# Patient Record
Sex: FEMALE | Race: WHITE | URBAN - METROPOLITAN AREA
[De-identification: names, ages, dates, MRNs, and addresses within clinical notes are randomized per-mention and may not be internally consistent; named-entity substitution may affect disease eponyms.]

---

## 2017-01-01 ENCOUNTER — NURSING HOME VISIT (OUTPATIENT)
Dept: GERIATRICS | Facility: CLINIC | Age: 82
End: 2017-01-01

## 2017-01-01 ENCOUNTER — HOSPITAL LABORATORY (OUTPATIENT)
Facility: OTHER | Age: 82
End: 2017-01-01

## 2017-01-01 ENCOUNTER — NURSING HOME VISIT (OUTPATIENT)
Dept: GERIATRICS | Facility: CLINIC | Age: 82
End: 2017-01-01
Payer: COMMERCIAL

## 2017-01-01 ENCOUNTER — TELEPHONE (OUTPATIENT)
Dept: CARDIOLOGY | Facility: CLINIC | Age: 82
End: 2017-01-01

## 2017-01-01 ENCOUNTER — TELEPHONE (OUTPATIENT)
Dept: GERIATRICS | Facility: CLINIC | Age: 82
End: 2017-01-01

## 2017-01-01 ENCOUNTER — DOCUMENTATION ONLY (OUTPATIENT)
Dept: GERIATRICS | Facility: CLINIC | Age: 82
End: 2017-01-01

## 2017-01-01 ENCOUNTER — OFFICE VISIT (OUTPATIENT)
Dept: CARDIOLOGY | Facility: CLINIC | Age: 82
End: 2017-01-01
Payer: COMMERCIAL

## 2017-01-01 ENCOUNTER — DOCUMENTATION ONLY (OUTPATIENT)
Dept: OTHER | Facility: CLINIC | Age: 82
End: 2017-01-01

## 2017-01-01 VITALS
OXYGEN SATURATION: 96 % | DIASTOLIC BLOOD PRESSURE: 63 MMHG | WEIGHT: 121 LBS | BODY MASS INDEX: 27.13 KG/M2 | HEART RATE: 65 BPM | SYSTOLIC BLOOD PRESSURE: 98 MMHG

## 2017-01-01 VITALS
RESPIRATION RATE: 17 BRPM | TEMPERATURE: 98 F | SYSTOLIC BLOOD PRESSURE: 138 MMHG | OXYGEN SATURATION: 93 % | WEIGHT: 127 LBS | BODY MASS INDEX: 28.47 KG/M2 | HEART RATE: 85 BPM | DIASTOLIC BLOOD PRESSURE: 61 MMHG

## 2017-01-01 VITALS
WEIGHT: 122 LBS | OXYGEN SATURATION: 93 % | SYSTOLIC BLOOD PRESSURE: 119 MMHG | RESPIRATION RATE: 24 BRPM | HEART RATE: 118 BPM | TEMPERATURE: 101.3 F | DIASTOLIC BLOOD PRESSURE: 66 MMHG | BODY MASS INDEX: 27.35 KG/M2

## 2017-01-01 VITALS
OXYGEN SATURATION: 98 % | BODY MASS INDEX: 27.69 KG/M2 | WEIGHT: 123.5 LBS | HEART RATE: 82 BPM | TEMPERATURE: 98.3 F | RESPIRATION RATE: 18 BRPM | SYSTOLIC BLOOD PRESSURE: 130 MMHG | DIASTOLIC BLOOD PRESSURE: 84 MMHG

## 2017-01-01 VITALS
HEART RATE: 58 BPM | DIASTOLIC BLOOD PRESSURE: 66 MMHG | RESPIRATION RATE: 19 BRPM | TEMPERATURE: 97.6 F | SYSTOLIC BLOOD PRESSURE: 114 MMHG | OXYGEN SATURATION: 95 %

## 2017-01-01 VITALS
OXYGEN SATURATION: 95 % | WEIGHT: 131.9 LBS | DIASTOLIC BLOOD PRESSURE: 57 MMHG | BODY MASS INDEX: 29.57 KG/M2 | RESPIRATION RATE: 12 BRPM | SYSTOLIC BLOOD PRESSURE: 107 MMHG | TEMPERATURE: 98.1 F | HEART RATE: 70 BPM

## 2017-01-01 VITALS
RESPIRATION RATE: 17 BRPM | WEIGHT: 131.9 LBS | HEART RATE: 63 BPM | DIASTOLIC BLOOD PRESSURE: 68 MMHG | OXYGEN SATURATION: 95 % | SYSTOLIC BLOOD PRESSURE: 135 MMHG | BODY MASS INDEX: 29.57 KG/M2 | TEMPERATURE: 97.9 F

## 2017-01-01 DIAGNOSIS — I10 ESSENTIAL HYPERTENSION: ICD-10-CM

## 2017-01-01 DIAGNOSIS — K59.03 DRUG-INDUCED CONSTIPATION: ICD-10-CM

## 2017-01-01 DIAGNOSIS — G89.4 CHRONIC PAIN SYNDROME: Primary | ICD-10-CM

## 2017-01-01 DIAGNOSIS — F33.1 MAJOR DEPRESSIVE DISORDER, RECURRENT EPISODE, MODERATE (H): ICD-10-CM

## 2017-01-01 DIAGNOSIS — E55.9 VITAMIN D DEFICIENCY: ICD-10-CM

## 2017-01-01 DIAGNOSIS — I73.9 PVD (PERIPHERAL VASCULAR DISEASE) (H): Primary | ICD-10-CM

## 2017-01-01 DIAGNOSIS — L89.92 PRESSURE ULCER, STAGE II (H): Primary | ICD-10-CM

## 2017-01-01 DIAGNOSIS — Z51.5 HOSPICE CARE PATIENT: ICD-10-CM

## 2017-01-01 DIAGNOSIS — I25.10 CORONARY ARTERY DISEASE INVOLVING NATIVE CORONARY ARTERY OF NATIVE HEART WITHOUT ANGINA PECTORIS: ICD-10-CM

## 2017-01-01 DIAGNOSIS — Z71.89 ADVANCED DIRECTIVES, COUNSELING/DISCUSSION: Primary | Chronic | ICD-10-CM

## 2017-01-01 DIAGNOSIS — M48.00 SPINAL STENOSIS, UNSPECIFIED SPINAL REGION: Primary | ICD-10-CM

## 2017-01-01 DIAGNOSIS — I73.9 PVD (PERIPHERAL VASCULAR DISEASE) (H): ICD-10-CM

## 2017-01-01 DIAGNOSIS — J18.9 PNEUMONIA OF BOTH LOWER LOBES DUE TO INFECTIOUS ORGANISM: ICD-10-CM

## 2017-01-01 DIAGNOSIS — J44.9 CHRONIC OBSTRUCTIVE PULMONARY DISEASE, UNSPECIFIED COPD TYPE (H): ICD-10-CM

## 2017-01-01 DIAGNOSIS — J18.9 PNEUMONIA OF BOTH LOWER LOBES DUE TO INFECTIOUS ORGANISM: Primary | ICD-10-CM

## 2017-01-01 DIAGNOSIS — R54 FRAIL ELDERLY: ICD-10-CM

## 2017-01-01 LAB
ALBUMIN UR-MCNC: 30 MG/DL
ANION GAP SERPL CALCULATED.3IONS-SCNC: 8 MMOL/L (ref 3–14)
APPEARANCE UR: ABNORMAL
BACTERIA #/AREA URNS HPF: ABNORMAL /HPF
BACTERIA SPEC CULT: NORMAL
BILIRUB UR QL STRIP: NEGATIVE
BUN SERPL-MCNC: 16 MG/DL (ref 7–30)
CALCIUM SERPL-MCNC: 8.3 MG/DL (ref 8.5–10.1)
CHLORIDE SERPL-SCNC: 102 MMOL/L (ref 94–109)
CO2 SERPL-SCNC: 27 MMOL/L (ref 20–32)
COLOR UR AUTO: YELLOW
CREAT SERPL-MCNC: 0.77 MG/DL (ref 0.52–1.04)
ERYTHROCYTE [DISTWIDTH] IN BLOOD BY AUTOMATED COUNT: 14.7 % (ref 10–15)
GFR SERPL CREATININE-BSD FRML MDRD: 70 ML/MIN/1.7M2
GLUCOSE SERPL-MCNC: 83 MG/DL (ref 70–99)
GLUCOSE UR STRIP-MCNC: NEGATIVE MG/DL
HCT VFR BLD AUTO: 41.2 % (ref 35–47)
HGB BLD-MCNC: 13 G/DL (ref 11.7–15.7)
HGB UR QL STRIP: ABNORMAL
KETONES UR STRIP-MCNC: NEGATIVE MG/DL
LEUKOCYTE ESTERASE UR QL STRIP: ABNORMAL
MCH RBC QN AUTO: 28 PG (ref 26.5–33)
MCHC RBC AUTO-ENTMCNC: 31.6 G/DL (ref 31.5–36.5)
MCV RBC AUTO: 89 FL (ref 78–100)
MICRO REPORT STATUS: NORMAL
NITRATE UR QL: NEGATIVE
NON-SQ EPI CELLS #/AREA URNS LPF: ABNORMAL /LPF
PH UR STRIP: 5.5 PH (ref 5–7)
PLATELET # BLD AUTO: 172 10E9/L (ref 150–450)
POTASSIUM SERPL-SCNC: 3.9 MMOL/L (ref 3.4–5.3)
RBC # BLD AUTO: 4.64 10E12/L (ref 3.8–5.2)
RBC #/AREA URNS AUTO: ABNORMAL /HPF (ref 0–2)
SODIUM SERPL-SCNC: 137 MMOL/L (ref 133–144)
SP GR UR STRIP: 1.01 (ref 1–1.03)
SPECIMEN SOURCE: NORMAL
TSH SERPL DL<=0.05 MIU/L-ACNC: 2.17 MU/L (ref 0.4–4)
URN SPEC COLLECT METH UR: ABNORMAL
UROBILINOGEN UR STRIP-ACNC: 0.2 EU/DL (ref 0.2–1)
WBC # BLD AUTO: 8.8 10E9/L (ref 4–11)
WBC #/AREA URNS AUTO: >100 /HPF (ref 0–2)

## 2017-01-01 PROCEDURE — 99309 SBSQ NF CARE MODERATE MDM 30: CPT | Performed by: NURSE PRACTITIONER

## 2017-01-01 PROCEDURE — 99207 ZZC CDG-CORRECTLY CODED, REVIEWED AND AGREE: CPT | Performed by: FAMILY MEDICINE

## 2017-01-01 PROCEDURE — 99207 ZZC CDG-CORRECTLY CODED, REVIEWED AND AGREE: CPT | Performed by: NURSE PRACTITIONER

## 2017-01-01 PROCEDURE — 99310 SBSQ NF CARE HIGH MDM 45: CPT | Performed by: FAMILY MEDICINE

## 2017-01-01 PROCEDURE — 99309 SBSQ NF CARE MODERATE MDM 30: CPT | Mod: GW | Performed by: NURSE PRACTITIONER

## 2017-01-01 PROCEDURE — 99497 ADVNCD CARE PLAN 30 MIN: CPT | Mod: GW | Performed by: NURSE PRACTITIONER

## 2017-01-01 PROCEDURE — 99308 SBSQ NF CARE LOW MDM 20: CPT | Performed by: NURSE PRACTITIONER

## 2017-01-01 PROCEDURE — 99310 SBSQ NF CARE HIGH MDM 45: CPT | Mod: GW | Performed by: NURSE PRACTITIONER

## 2017-01-01 PROCEDURE — 99307 SBSQ NF CARE SF MDM 10: CPT | Performed by: NURSE PRACTITIONER

## 2017-01-01 PROCEDURE — 99203 OFFICE O/P NEW LOW 30 MIN: CPT | Performed by: INTERNAL MEDICINE

## 2017-01-01 RX ORDER — ATROPINE SULFATE 10 MG/ML
2 SOLUTION/ DROPS OPHTHALMIC
COMMUNITY
Start: 2017-01-01

## 2017-01-01 RX ORDER — POLYETHYLENE GLYCOL 3350 17 G/17G
17 POWDER, FOR SOLUTION ORAL DAILY
COMMUNITY
Start: 2017-01-01

## 2017-01-01 RX ORDER — MORPHINE SULFATE 100 MG/5ML
7.5 SOLUTION ORAL
COMMUNITY
Start: 2017-01-01

## 2017-01-01 RX ORDER — POTASSIUM CHLORIDE 1500 MG/1
0.5 TABLET, EXTENDED RELEASE ORAL DAILY
COMMUNITY
Start: 2014-08-28 | End: 2017-01-01

## 2017-01-01 RX ORDER — BISACODYL 10 MG
10 SUPPOSITORY, RECTAL RECTAL 2 TIMES DAILY PRN
COMMUNITY
End: 2017-01-01

## 2017-01-01 RX ORDER — LORAZEPAM 0.5 MG/1
0.5 TABLET ORAL
COMMUNITY
Start: 2017-01-01

## 2017-01-01 RX ORDER — BUDESONIDE AND FORMOTEROL FUMARATE DIHYDRATE 160; 4.5 UG/1; UG/1
2 AEROSOL RESPIRATORY (INHALATION) DAILY
COMMUNITY
Start: 2014-11-23

## 2017-01-11 NOTE — PROGRESS NOTES
Chief Complaint   Patient presents with     Wound Check       HPI:    Nasima López is a 89 year old  (9/11/1926), who is being seen today for an episodic care visit at [facility]. Nsg asking me to see pt today regarding coccyx pressure ulcer. Has been present for about 10 + days. Located on superior end of gluteal cleft. Pt has pressure relieving w/c cushion.    Current treatmet is using barrier cream    nsg concerned about about wound bed now has slough in it    REVIEW OF SYSTEMS:  4 point ROS including Respiratory, CV, GI and , other than that noted in the HPI,  is negative    GENERAL APPEARANCE:  Alert, in no distress  Wound bed 0.1 x 0.2 - scar tissue area surrounding - wound bed is white  No drainage    Assessment/Plan:  Pressure ulcer, stage II  Discussed further treatment options with sgt   enocurage more off loading - with standing q 2hrs while awake.  That - over any topical will help the best.   Pt is not ambulatoy.   Up date us of worsen        MARILEE House CNP

## 2017-02-16 NOTE — PROGRESS NOTES
[facility]    Chief Complaint   Patient presents with     Nursing Home Acute       HPI:    Nasima López is a 89 year old  (9/11/1926), who is being seen today for an episodic care visit at [facility] . Today's concern is:  Chronic pain  She has multiple areas of OA in the joints that cause her significant pain.  When the pain gets worse she will lose her voice infact. She has been on the same dose of MS Contin for quit a while now and may be in need of an increase.  She is in agreement to this.  She had her hearing aid in place yesterday and apparently a piece of the HA was missing when removed from the ear.  Her daughter was upset and felt the ear needed to be examined and then flushed.  There is no foreign body found in the canal.  There is minimal cerumen and no need for flushing.       ALLERGIES: Peanuts [nuts]; Augmentin; Codeine; and Inhaler [amphetamines]  Past Medical, Surgical, Family and Social History reviewed and updated in EPIC.    Current Outpatient Prescriptions   Medication Sig Dispense Refill     sennosides (SENOKOT) 8.6 MG tablet Take 2 tablets by mouth daily       Nutritional Supplements (NUTRITIONAL SUPPLEMENT PO) Take 4 oz by mouth daily       VITAMIN D, CHOLECALCIFEROL, PO Take 2,000 Units by mouth daily       Pantoprazole Sodium (PROTONIX PO) Take 40 mg by mouth At Bedtime        carbamide peroxide (DEBROX) 6.5 % otic (EAR) solution Place 5 drops into both ears X 5days at HS then lavage.  Repeat every 3 months.       calcium carbonate (TUMS) 500 MG chewable tablet Take 2 chew tab by mouth daily       Morphine Sulfate (MS CONTIN PO) Take 30 mg by mouth every 8 hours       HYDROcodone-acetaminophen (NORCO) 7.5-325 MG per tablet Take 1 tablet by mouth every 4 hours as needed for moderate to severe pain       Nitroglycerin (NITROSTAT SL) Place 0.4 mg under the tongue every 5 minutes as needed for chest pain       artificial saliva (BIOTENE MT) AERS spray Take 1  spray by mouth 2 times daily        olopatadine HCl (PATADAY) 0.2 % SOLN Place 1 drop into both eyes daily       ketorolac tromethamine (ACULAR-LS) 0.4 % SOLN ophthalmic solution Place 1 drop into both eyes 2 times daily        LOSARTAN POTASSIUM PO Take 25 mg by mouth daily       Gabapentin (NEURONTIN PO) Take 400 mg by mouth 3 times daily       furosemide (LASIX) 40 MG tablet Take 0.5 tablets by mouth daily. 15 tablet 3     ASPIRIN NOT PRESCRIBED, INTENTIONAL, Antiplatelet medication not prescribed intentionally due to GI Issues / Ulcer Disease 0 each 0     potassium chloride (KLOR-CON) 10 MEQ CR tablet Take 1 tablet by mouth. Every other day (Patient taking differently: daily ) 15 tablet 3     budesonide-formoterol (SYMBICORT) 160-4.5 MCG/ACT inhaler Inhale 2 puffs into the lungs 2 times daily. (Patient taking differently: Inhale 2 puffs into the lungs daily ) 1 Inhaler 5     ipratropium - albuterol 0.5 mg/2.5 mg/3 mL (DUONEB) 0.5-2.5 (3) MG/3ML nebulization Take 3 mLs by nebulization every 6 hours as needed for shortness of breath / dyspnea. 1 Box 1     Medications reviewed:  Medications reconciled to facility chart and changes were made to reflect current medications as identified as above med list. Below are the changes that were made:   Medications stopped since last EPIC medication reconciliation:   There are no discontinued medications.    Medications started since last [facility] medication reconciliation:  No orders of the defined types were placed in this encounter.    REVIEW OF SYSTEMS:  4 point ROS including Respiratory, CV, GI and , other than that noted in the HPI,  is negative    Physical Exam:  /68  Pulse 63  Temp 97.9  F (36.6  C)  Resp 17  Wt 131 lb 14.4 oz (59.8 kg)  SpO2 95%  BMI 29.57 kg/m2  GENERAL APPEARANCE:  Alert, in no distress, cooperative  RESP:  lungs clear to auscultation , no respiratory distress  CV:  peripheral edema 2+ in bilaterally, rate-normal  ABDOMEN:  normal bowel sounds,  soft, nontender, no hepatosplenomegaly or other masses, bowel sounds normal, soft, non-tender  M/S:   Gait and station abnormal she is not able to ambulate and uses a wheelchair for all mobility needs.  She has multiple areas of OA which cause her pain most of the day.  Digits and nails abnormal has deformities over her hands due to OA.  She is having more difficulty using her hands to eat, care forself.  PSYCH:  oriented X 3, memory impaired , she is speaking more about end of life concerns of late.  When asked is interested in Hospice she denies the need.    Recent Labs:    Hospital Laboratory on 11/23/2016   Component Date Value Ref Range Status     Sodium 11/23/2016 143  133 - 144 mmol/L Final     Potassium 11/23/2016 4.7  3.4 - 5.3 mmol/L Final     Chloride 11/23/2016 106  94 - 109 mmol/L Final     Carbon Dioxide 11/23/2016 32  20 - 32 mmol/L Final     Anion Gap 11/23/2016 5  3 - 14 mmol/L Final     Glucose 11/23/2016 77  70 - 99 mg/dL Final     Urea Nitrogen 11/23/2016 14  7 - 30 mg/dL Final     Creatinine 11/23/2016 0.72  0.52 - 1.04 mg/dL Final     GFR Estimate 11/23/2016 76  >60 mL/min/1.7m2 Final    Non  GFR Calc     GFR Estimate If Black 11/23/2016   >60 mL/min/1.7m2 Final                    Value:>90   GFR Calc       Calcium 11/23/2016 8.3* 8.5 - 10.1 mg/dL Final     WBC 11/23/2016 6.2  4.0 - 11.0 10e9/L Final     RBC Count 11/23/2016 4.42  3.8 - 5.2 10e12/L Final     Hemoglobin 11/23/2016 12.8  11.7 - 15.7 g/dL Final     Hematocrit 11/23/2016 41.1  35.0 - 47.0 % Final     MCV 11/23/2016 93  78 - 100 fl Final     MCH 11/23/2016 29.0  26.5 - 33.0 pg Final     MCHC 11/23/2016 31.1* 31.5 - 36.5 g/dL Final     RDW 11/23/2016 13.8  10.0 - 15.0 % Final     Platelet Count 11/23/2016 192  150 - 450 10e9/L Final     Vitamin D Deficiency screening 11/23/2016 24  20 - 75 ug/L Final    Comment: Season, race, dietary intake, and treatment affect the concentration of    25-hydroxy-Vitamin D. Values may decrease during winter months and increase   during summer months. Values 20-29 ug/L may indicate Vitamin D insufficiency   and values <20 ug/L may indicate Vitamin D deficiency.   Vitamin D determination is routinely performed by an immunoassay specific for   25 hydroxyvitamin D3.  If an individual is on vitamin D2 (ergocalciferol)   supplementation, please specify 25 OH vitamin D2 and D3 level determination   by   LCMSMS test [id number].            Assessment/Plan:  Chronic pain    Orders:  1.  Increase AM MS Contin dose  to 45mg daily    Electronically signed by  Nissa Avitia, CNP, APRN

## 2017-02-19 NOTE — PROGRESS NOTES
Received a call from nursing at [facility] that Nasima had a med error discovered on her from yesterday.  Was to have 45mg at 4am and 30mg other two times.  Recent medication increase to this dose.  Received 45mg at 4am and then again at 12N on 2/18/17    Resident doing fine.  No new orders    Electronically signed by Dawna Kruse RN, CNP

## 2017-02-22 NOTE — PROGRESS NOTES
[place] [facility]    Chief Complaint   Patient presents with     [facility] Regulatory       HPI:    Nasima López is a 89 year old  (9/11/1926), who is being seen today for a federally mandated E/M visit at [facility] . Today's concerns are:  1. Chronic pain syndrome    2. Essential hypertension    3. Chronic obstructive pulmonary disease, unspecified COPD type (H)    4. Major depressive disorder, recurrent episode, moderate (H)      She is having more pain.  Her voice is softer, she is more irritable.  She is becoming more willing to remain in her room.  She does sleep more also.  Blood pressure remains in good range without changes.  She is not having issues with breathing.  She does not need the oxygen at all times but does use it intermittently.    She is presenting with some evidence of depression.  She is not currently on an antidepressant.    ALLERGIES: Peanuts [nuts]; Augmentin; Codeine; and Inhaler [amphetamines]  PAST MEDICAL HISTORY:  has a past medical history of Advanced directives, counseling/discussion (10/1/2012); Aftercare following surgery of the teeth, oral cavity and digestive system, NEC (9/2/2014); Asthma; Bradycardia; CAD (coronary artery disease); Carotid stenosis, bilateral (7/26/2012); Carpel tunnel syndrome; Chronic airway obstruction, not elsewhere classified (9/2/2014); Chronic pain (9/2/2014); Chronic venous hypertension due to DVT; COPD (chronic obstructive pulmonary disease) (H); Disorder of bone and cartilage, unspecified; Diverticulitis of colon (4/28/2011); DJD (degenerative joint disease); DJD (degenerative joint disease) of cervical spine (10/1/2012); Fibromyalgia (9/16/2014); GERD (gastroesophageal reflux disease); Glaucoma; Hand swelling (12/3/2014); HTN (hypertension) (1/5/2010); Hyperlipidemia LDL goal <130 (10/31/2010); Lung nodule (9/30/2011); 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  PAST SURGICAL HISTORY:  has a past surgical history that includes surgical history of - (1956); surgical history of - (1962); surgical history of - (1962); surgical history of - (1974); surgical history of - (1975); surgical history of - (1990); surgical history of - (1990); surgical history of - (1992); surgical history of - (2000); surgical history of - (2002); surgical history of - (2004); Colonoscopy (6/21/2011); and hysterectomy, cervix status unknown (1967).  FAMILY HISTORY: family history includes CANCER in her brother, sister, and sister; HEART DISEASE in her brother and mother; Hypertension in her brother, brother, brother, father, maternal grandfather, maternal grandmother, mother, paternal grandfather, paternal grandmother, sister, sister, and sister; Respiratory in her mother.  SOCIAL HISTORY:  reports that she has never smoked. She has never used smokeless tobacco. She reports that she does not drink alcohol or use illicit drugs.    MEDICATIONS:  Current Outpatient Prescriptions   Medication Sig Dispense Refill     sennosides (SENOKOT) 8.6 MG tablet Take 2 tablets by mouth daily       Nutritional Supplements (NUTRITIONAL SUPPLEMENT PO) Take 4 oz by mouth daily       VITAMIN D, CHOLECALCIFEROL, PO Take 2,000 Units by mouth daily       Pantoprazole Sodium (PROTONIX PO) Take 40 mg by mouth At Bedtime        carbamide peroxide (DEBROX) 6.5 % otic (EAR) solution Place 5 drops into both ears X 5days at HS then lavage.  Repeat every 3 months.       calcium carbonate (TUMS) 500 MG chewable tablet Take 2 chew tab by mouth daily       Morphine Sulfate (MS CONTIN PO) Take 30 mg by mouth  every 8 hours       HYDROcodone-acetaminophen (NORCO) 7.5-325 MG per tablet Take 1 tablet by mouth every 4 hours as needed for moderate to severe pain       Nitroglycerin (NITROSTAT SL) Place 0.4 mg under the tongue every 5 minutes as needed for chest pain       artificial saliva (BIOTENE MT) AERS spray Take 1 spray by mouth 2 times daily        olopatadine HCl (PATADAY) 0.2 % SOLN Place 1 drop into both eyes daily       ketorolac tromethamine (ACULAR-LS) 0.4 % SOLN ophthalmic solution Place 1 drop into both eyes 2 times daily        LOSARTAN POTASSIUM PO Take 25 mg by mouth daily       Gabapentin (NEURONTIN PO) Take 400 mg by mouth 3 times daily       furosemide (LASIX) 40 MG tablet Take 0.5 tablets by mouth daily. 15 tablet 3     ASPIRIN NOT PRESCRIBED, INTENTIONAL, Antiplatelet medication not prescribed intentionally due to GI Issues / Ulcer Disease 0 each 0     potassium chloride (KLOR-CON) 10 MEQ CR tablet Take 1 tablet by mouth. Every other day (Patient taking differently: daily ) 15 tablet 3     budesonide-formoterol (SYMBICORT) 160-4.5 MCG/ACT inhaler Inhale 2 puffs into the lungs 2 times daily. (Patient taking differently: Inhale 2 puffs into the lungs daily ) 1 Inhaler 5     ipratropium - albuterol 0.5 mg/2.5 mg/3 mL (DUONEB) 0.5-2.5 (3) MG/3ML nebulization Take 3 mLs by nebulization every 6 hours as needed for shortness of breath / dyspnea. 1 Box 1     Medications reviewed:  Medications reconciled to facility chart and changes were made to reflect current medications as identified as above med list. Below are the changes that were made:   Medications stopped since last EPIC medication reconciliation:   There are no discontinued medications.    Medications started since last [facility] medication reconciliation:  No orders of the defined types were placed in this encounter.    Case Management:  I have reviewed the care plan and MDS and do agree with the plan. Patient's desire to return to the community is  present, but is not able due to care needs .  Information reviewed:  Medications, vital signs, orders, and nursing notes.    ROS:  4 point ROS including Respiratory, CV, GI and , other than that noted in the HPI,  is negative    Exam:  /57  Pulse 70  Temp 98.1  F (36.7  C)  Resp 12  Wt 131 lb 14.4 oz (59.8 kg)  SpO2 95%  BMI 29.57 kg/m2  GENERAL APPEARANCE:  Alert, anxious  EYES:  EOM normal, Conjunctiva and lids normal  NECK:  no adenopathy, no thyromegaly  RESP:  lungs clear to auscultation , no respiratory distress  CV:  peripheral edema 2+ in bilaterally, rate-normal  ABDOMEN:  no guarding or rebound, bowel sounds normal  M/S:   Gait and station abnormal She is not able to ambulate at all. She is able to stand and pivot with walker and assistance.  She uses a wheelchair for all mobility issues.  Digits and nails normal  SKIN:  Her skin is very fragil.  She has enlarged joints in her fingers, ankles.  NEURO:   Cranial nerves 2-12 are normal tested and grossly at patient's baseline, Examination of sensation by touch normal  PSYCH:  memory impaired , affect and mood normal    Lab/Diagnostic data:    Hospital Laboratory on 11/23/2016   Component Date Value Ref Range Status     Sodium 11/23/2016 143  133 - 144 mmol/L Final     Potassium 11/23/2016 4.7  3.4 - 5.3 mmol/L Final     Chloride 11/23/2016 106  94 - 109 mmol/L Final     Carbon Dioxide 11/23/2016 32  20 - 32 mmol/L Final     Anion Gap 11/23/2016 5  3 - 14 mmol/L Final     Glucose 11/23/2016 77  70 - 99 mg/dL Final     Urea Nitrogen 11/23/2016 14  7 - 30 mg/dL Final     Creatinine 11/23/2016 0.72  0.52 - 1.04 mg/dL Final     GFR Estimate 11/23/2016 76  >60 mL/min/1.7m2 Final    Non  GFR Calc     GFR Estimate If Black 11/23/2016   >60 mL/min/1.7m2 Final                    Value:>90   GFR Calc       Calcium 11/23/2016 8.3* 8.5 - 10.1 mg/dL Final     WBC 11/23/2016 6.2  4.0 - 11.0 10e9/L Final     RBC Count 11/23/2016 4.42   3.8 - 5.2 10e12/L Final     Hemoglobin 11/23/2016 12.8  11.7 - 15.7 g/dL Final     Hematocrit 11/23/2016 41.1  35.0 - 47.0 % Final     MCV 11/23/2016 93  78 - 100 fl Final     MCH 11/23/2016 29.0  26.5 - 33.0 pg Final     MCHC 11/23/2016 31.1* 31.5 - 36.5 g/dL Final     RDW 11/23/2016 13.8  10.0 - 15.0 % Final     Platelet Count 11/23/2016 192  150 - 450 10e9/L Final     Vitamin D Deficiency screening 11/23/2016 24  20 - 75 ug/L Final    Comment: Season, race, dietary intake, and treatment affect the concentration of   25-hydroxy-Vitamin D. Values may decrease during winter months and increase   during summer months. Values 20-29 ug/L may indicate Vitamin D insufficiency   and values <20 ug/L may indicate Vitamin D deficiency.   Vitamin D determination is routinely performed by an immunoassay specific for   25 hydroxyvitamin D3.  If an individual is on vitamin D2 (ergocalciferol)   supplementation, please specify 25 OH vitamin D2 and D3 level determination   by   LCMSMS test [id number].           ASSESSMENT/PLAN  1. Chronic pain syndrome    2. Essential hypertension    3. Chronic obstructive pulmonary disease, unspecified COPD type (H)    4. Major depressive disorder, recurrent episode, moderate (H)      Orders:  1.  Will continue with recent increase in MS Contin now at 45 mg po BID and D/C the 30 mg dose at midday.  2.  Continue to monitor B/P readings  3.  No additional changes are needed in medications or in POC.    Electronically signed by:  Nissa Avitia, CNP, APRN

## 2017-03-15 NOTE — TELEPHONE ENCOUNTER
"Left message on personal voicemail at [facility] for Annette Blackman, RN supervisor at [facility] 3/8/17 at 0918 to clarify what pt is being seen for. (No order) Note 12/27/16 from Avitia states, \"Pt has PVD needs CTA. Seen by Dr. Patel on 12/20/16. He called on 12/25/16 with orders to the Nursing facility. Order for U/S of both LE for BEN's and evaluate for DVT. Results of the test: Evidence of Arthrosclerotic vascular disease L>R. No occulsions. CTA or conventional program recommended for further evaluation.\" (I do not see this result) Dx: PVD/HTN/COPD. At last OV, Noa Olsen MD ordered overnight oximetry but this order was cancelled 1/5/17. (Pt was to follow with cardiology on a PRN basis) Pt's daughter Vanessa states, \"Dr. Patel wanted her seen for foot swelling bilat-(varies) and cold hands\" Nusrat Zhang RN Cardiology at [facility] March 8, 2017, 9:23 AM   Did not hear back from facility so called again today. Spoke with ELLY Solorzano. She will fax BEN and LE US reports done by [facility] to us for review. She states reason for visit=PAD. Reviewed result of LE US (BEN's unable to be performed 2/2 pain) Call placed to Vanessa, daughter to offer vascular consult with MD Taras. Nusrat Zhang RN Cardiology at [facility] March 15, 2017, 3:03 PM  ADDENDUM: Discussed the above with daughter, Vanessa. Would like to change appt to vascular cardiologist, MD Taras on 4/7/17 at 10:30 am. Appt changed. Nursing Home contacted as well (ELLY Solorzano) Nusrat Zhang RN Cardiology at [facility] March 16, 2017, 10:50 AM  "

## 2017-04-07 NOTE — MR AVS SNAPSHOT
"              After Visit Summary   4/7/2017    Nasima López    MRN: [id number]           Patient Information     Date Of Birth          9/11/1926        Visit Information        Provider Department      4/7/2017 10:30 AM Lashay Grajeda MD [facility] PHYSICIAN HEART AT [facility]        Today's Diagnoses     Spinal stenosis    -  1      Care Instructions    Thank you for your  Heart Care visit today. Your provider has recommended the following:  Medication Changes:  None today. Continue taking your medications as you have been.  Recommendations:  1. A referral has been placed for you to receive physical therapy for your spinal stenosis. I have attached a copy of this order.  Follow-up:  See cardiology on an \"as needed\" basis.  We kindly ask that you call cardiology scheduling at [phone] three months prior to requested revisit date to schedule future cardiology appointments.  Reminder:  1. Please bring in your current medication list or your medication, over the counter supplements and vitamin bottles as we will review these at each office visit.               [facility]~[address]~[place], [place]~[address]  Questions about your visit today?  Call your Cardiology Clinic RN's-Nora Zhang and/or Rose Pérez at [phone].              Follow-ups after your visit        Additional Services     PHYSICAL THERAPY REFERRAL       *This therapy referral will be filtered to a centralized scheduling office at [facility] and the patient will receive a call to schedule an appointment at a [place] location most convenient for them. *     [facility] provides Physical Therapy evaluation and treatment and many specialty services across the [place] system.  If requesting a specialty program, please choose from the list below.    If you have not heard from the scheduling office within 2 " "business days, please call [phone] for all locations, with the exception of [place], please call [phone].  Treatment: Evaluation & Treatment  Special Instructions/Modalities: Neuropathic Pain/Spinal Stenosis. Please advise exercises and therapy plan.  Special Programs: None    Please be aware that coverage of these services is subject to the terms and limitations of your health insurance plan.  Call member services at your health plan with any benefit or coverage questions.      **Note to Provider:  If you are referring outside of [place] for the therapy appointment, please list the name of the location in the  special instructions  above, print the referral and give to the patient to schedule the appointment.                  Future tests that were ordered for you today     Open Future Orders        Priority Expected Expires Ordered    PHYSICAL THERAPY REFERRAL Routine 4/10/2017 4/7/2019 4/7/2017            Who to contact     If you have questions or need follow up information about today's clinic visit or your schedule please contact [facility] PHYSICIAN HEART AT [facility] directly at [phone].  Normal or non-critical lab and imaging results will be communicated to you by [employer], letter or phone within 4 business days after the clinic has received the results. If you do not hear from us within 7 days, please contact the clinic through [employer] or phone. If you have a critical or abnormal lab result, we will notify you by phone as soon as possible.  Submit refill requests through [employer] or call your pharmacy and they will forward the refill request to us. Please allow 3 business days for your refill to be completed.          Additional Information About Your Visit        [employer] Information     [employer] lets you send messages to your doctor, view your test results, renew your prescriptions, schedule appointments and more. To sign up, go to [email or web address] . Click on \"Log in\" " "on the left side of the screen, which will take you to the Welcome page. Then click on \"Sign up Now\" on the right side of the page.     You will be asked to enter the access code listed below, as well as some personal information. Please follow the directions to create your username and password.     Your access code is: [email or web address]  Expires: 2017 11:04 AM     Your access code will  in 90 days. If you need help or a new code, please call your [place] clinic or [phone].        Care EveryWhere ID     This is your Care EveryWhere ID. This could be used by other organizations to access your [place] medical records  [id number]        Your Vitals Were     Pulse Pulse Oximetry BMI (Body Mass Index)             65 96% 27.13 kg/m2          Blood Pressure from Last 3 Encounters:   17 98/63   17 107/57   17 135/68    Weight from Last 3 Encounters:   17 54.9 kg (121 lb)   17 59.8 kg (131 lb 14.4 oz)   17 59.8 kg (131 lb 14.4 oz)                 Today's Medication Changes          These changes are accurate as of: 17 11:18 AM.  If you have any questions, ask your nurse or doctor.               These medicines have changed or have updated prescriptions.        Dose/Directions    budesonide-formoterol 160-4.5 MCG/ACT Inhaler   Commonly known as:  SYMBICORT   This may have changed:  when to take this   Used for:  COPD (chronic obstructive pulmonary disease) (H)        Dose:  2 puff   Inhale 2 puffs into the lungs 2 times daily.   Quantity:  1 Inhaler   Refills:  5       potassium chloride 10 MEQ CR tablet   Commonly known as:  KLOR-CON   This may have changed:    - when to take this  - additional instructions   Used for:  HTN (hypertension)        Take 1 tablet by mouth. Every other day   Quantity:  15 tablet   Refills:  3                Primary Care Provider Office Phone # Fax #    MARILEE Juan [phone]       [facility] [address] [address]        Thank you!     Thank you for choosing [facility] PHYSICIAN HEART AT [facility]  for your care. Our goal is always to provide you with excellent care. Hearing back from our patients is one way we can continue to improve our services. Please take a few minutes to complete the written survey that you may receive in the mail after your visit with us. Thank you!             Your Updated Medication List - Protect others around you: Learn how to safely use, store and throw away your medicines at www.disposemymeds.org.          This list is accurate as of: 4/7/17 11:18 AM.  Always use your most recent med list.                   Brand Name Dispense Instructions for use    artificial saliva Aers spray      Take 1 spray by mouth 2 times daily       ASPIRIN NOT PRESCRIBED    INTENTIONAL    0 each    Antiplatelet medication not prescribed intentionally due to GI Issues / Ulcer Disease       budesonide-formoterol 160-4.5 MCG/ACT Inhaler    SYMBICORT    1 Inhaler    Inhale 2 puffs into the lungs 2 times daily.       calcium carbonate 500 MG chewable tablet    TUMS     Take 2 chew tab by mouth daily       carbamide peroxide 6.5 % otic solution    DEBROX     Place 5 drops into both ears X 5days at HS then lavage.  Repeat every 3 months.       furosemide 40 MG tablet    LASIX    15 tablet    Take 0.5 tablets by mouth daily.       HYDROcodone-acetaminophen 7.5-325 MG per tablet    NORCO     Take 1 tablet by mouth every 4 hours as needed for moderate to severe pain       ipratropium - albuterol 0.5 mg/2.5 mg/3 mL 0.5-2.5 (3) MG/3ML neb solution    DUONEB    1 Box    Take 3 mLs by nebulization every 6 hours as needed for shortness of breath / dyspnea.       ketorolac tromethamine 0.4 % Soln ophthalmic solution    ACULAR-LS     Place 1 drop into both eyes 2 times daily       LOSARTAN POTASSIUM PO      Take 25 mg by mouth daily       MS CONTIN PO      Take 30 mg by mouth every 8  hours       NEURONTIN PO      Take 400 mg by mouth 3 times daily       NITROSTAT SL      Place 0.4 mg under the tongue every 5 minutes as needed for chest pain       NUTRITIONAL SUPPLEMENT PO      Take 4 oz by mouth daily       PATADAY 0.2 % Soln   Generic drug:  olopatadine HCl      Place 1 drop into both eyes daily       potassium chloride 10 MEQ CR tablet    KLOR-CON    15 tablet    Take 1 tablet by mouth. Every other day       PROTONIX PO      Take 40 mg by mouth At Bedtime       sennosides 8.6 MG tablet    SENOKOT     Take 2 tablets by mouth daily       VITAMIN D (CHOLECALCIFEROL) PO      Take 2,000 Units by mouth daily

## 2017-04-07 NOTE — PROGRESS NOTES
HISTORY OF PRESENT ILLNESS:  I had the pleasure to follow up with your patient, Ms. Nasima Lópze, in the Cardiovascular Medicine Clinic.  Previously she has been seeing my colleague, Dr. Olsen, who last saw her in 03/2015.        The patient, per her previous workup, has had a diagnosis of spinal stenosis for many years.  Unfortunately has had neuropathic pain for a long period of time.  In addition to this, she has also had upper extremity numbness and tingling and states that she has significant neck pain and rotator cuff pain as well.  She is mainly wheelchair bound and does have some physical therapy, which is provided to her at her nursing facility.        She states that she had a DVT in the past and this was evaluated.  She was on anticoagulation for a period of time, although is not on any recently.        The patient is accompanied by her daughter who provides additional history.  The patient states that she has considerable pain and is sometimes reluctant to take her pain medications.  She had a duplex ultrasound performed; however, ABIs were not able to be performed due to chronic neuropathic pain.  It appears on the right side lower extremity, there appears to be some monophasic signal at the distal posterior tibial and dorsalis pedis.  However, no acute findings were noted on the right lower extremity.  On the left side, there is by biphasic to monophasic signal in the left SFA and no acute findings in the popliteal and left calf arteries have a monophasic signal.  This suggests that although the disease is present, no high-grade stenoses would be suggested based on the Doppler waveform.  The patient, as noted above, does have significant neuropathic pain and I believe this is the etiology for her symptoms.      LIMITED PHYSICAL EXAMINATION:     VITAL SIGNS:  Blood pressure 90/63, heart rate is 65, weight 121 pounds.   GENERAL:  The patient is alert, oriented, appears stated age.   HEENT:  Oropharynx  is clear.   NECK:  Supple range of motion is impaired due to chronic arthritis.   CARDIOVASCULAR:  Distant heart tones.  Normal S1, S2.   LUNGS:  Coarse, diminished breath sounds.   ABDOMEN:  Soft, obese, nontender.   EXTREMITIES:  Changes suggestive of a dermatosclerosis.  No open wounds or sores are noted.  Pulses are 0 to 1+ in the DP and posterior tibial bilaterally.  Trace edema is appreciated.  Warm to touch.      ASSESSMENT:   1.  Spinal stenosis.   2.  Likely neuropathic pain.   3.  History of DVT in the past with varicose veins.   4.  Coronary artery disease with remote history of a stent.   5.  Hyperlipidemia.   6.  Hypertension.      RECOMMENDATIONS:  We have gone over patient's recent duplex ultrasound which was performed in 2016.  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  She did not have ABIs performed due to pain in her legs; however, the duplex signal suggests arterial flow should be adequate.  She does not have any open wounds or sores and her symptoms appear to be most compatible with neurogenic claudication or neuropathic symptoms from her spinal stenosis.  I have advised her that conservative management is usually appropriate and I will try to arrange for physical therapy, mainly focused on back exercises and mobility, to help relieve her pain.  I have also asked her to use her p.r.n. pain medications as-needed and not to be shy about requesting them.  She is somewhat reluctant to ask for them.      Thank you kindly for allowing us to participate in her care.  We will ask that she come back on a p.r.n. basis.      cc:      MARILEE Lima, CNP   [facility]   [address] [place] [address], [address]   [place] [address]         DESTINY ROSS MD             D: 2017 11:12   T: 2017 17:40   MT: LOW      Name:     PATRICIA BRIGHT   MRN:      [id number]        Account:      [id number]   :      1926            Service Date: 04/07/2017      Document: [id number]

## 2017-04-07 NOTE — LETTER
4/7/2017    Diana Gibson, MARILEE CNP  [facility]   [address]  [facility] [address]    RE: Nasima López       Dear Colleague,    I had the pleasure to follow up with your patient, Ms. Nasima López, in the Cardiovascular Medicine Clinic.  Previously she has been seeing my colleague, Dr. Olsen, who last saw her in 03/2015.        The patient, per her previous workup, has had a diagnosis of spinal stenosis for many years.  Unfortunately has had neuropathic pain for a long period of time.  In addition to this, she has also had upper extremity numbness and tingling and states that she has significant neck pain and rotator cuff pain as well.  She is mainly wheelchair bound and does have some physical therapy, which is provided to her at her nursing facility.        She states that she had a DVT in the past and this was evaluated.  She was on anticoagulation for a period of time, although is not on any recently.        The patient is accompanied by her daughter who provides additional history.  The patient states that she has considerable pain and is sometimes reluctant to take her pain medications.  She had a duplex ultrasound performed; however, ABIs were not able to be performed due to chronic neuropathic pain.  It appears on the right side lower extremity, there appears to be some monophasic signal at the distal posterior tibial and dorsalis pedis.  However, no acute findings were noted on the right lower extremity.  On the left side, there is by biphasic to monophasic signal in the left SFA and no acute findings in the popliteal and left calf arteries have a monophasic signal.  This suggests that although the disease is present, no high-grade stenoses would be suggested based on the Doppler waveform.  The patient, as noted above, does have significant neuropathic pain and I believe this is the etiology for her symptoms.      LIMITED PHYSICAL EXAMINATION:     VITAL SIGNS:  Blood pressure  90/63, heart rate is 65, weight 121 pounds.   GENERAL:  The patient is alert, oriented, appears stated age.   HEENT:  Oropharynx is clear.   NECK:  Supple range of motion is impaired due to chronic arthritis.   CARDIOVASCULAR:  Distant heart tones.  Normal S1, S2.   LUNGS:  Coarse, diminished breath sounds.   ABDOMEN:  Soft, obese, nontender.   EXTREMITIES:  Changes suggestive of a dermatosclerosis.  No open wounds or sores are noted.  Pulses are 0 to 1+ in the DP and posterior tibial bilaterally.  Trace edema is appreciated.  Warm to touch.   Outpatient Encounter Prescriptions as of 4/7/2017   Medication Sig Dispense Refill     sennosides (SENOKOT) 8.6 MG tablet Take 2 tablets by mouth daily       Nutritional Supplements (NUTRITIONAL SUPPLEMENT PO) Take 4 oz by mouth daily       Pantoprazole Sodium (PROTONIX PO) Take 40 mg by mouth At Bedtime        [DISCONTINUED] VITAMIN D, CHOLECALCIFEROL, PO Take 2,000 Units by mouth daily       [DISCONTINUED] carbamide peroxide (DEBROX) 6.5 % otic (EAR) solution Place 5 drops into both ears X 5days at HS then lavage.  Repeat every 3 months.       [DISCONTINUED] calcium carbonate (TUMS) 500 MG chewable tablet Take 2 chew tab by mouth daily       Morphine Sulfate (MS CONTIN PO) Take 45 mg by mouth 2 times daily        [DISCONTINUED] HYDROcodone-acetaminophen (NORCO) 7.5-325 MG per tablet Take 1 tablet by mouth every 4 hours as needed for moderate to severe pain       Nitroglycerin (NITROSTAT SL) Place 0.4 mg under the tongue every 5 minutes as needed for chest pain       [DISCONTINUED] artificial saliva (BIOTENE MT) AERS spray Take 1 spray by mouth 2 times daily        olopatadine HCl (PATADAY) 0.2 % SOLN Place 1 drop into both eyes daily       ketorolac tromethamine (ACULAR-LS) 0.4 % SOLN ophthalmic solution Place 1 drop into both eyes 2 times daily        Gabapentin (NEURONTIN PO) Take 500 mg by mouth 3 times daily        [DISCONTINUED] LOSARTAN POTASSIUM PO Take 12.5 mg by  mouth daily        [DISCONTINUED] furosemide (LASIX) 40 MG tablet Take 0.5 tablets by mouth daily. 15 tablet 3     ASPIRIN NOT PRESCRIBED, INTENTIONAL, Antiplatelet medication not prescribed intentionally due to GI Issues / Ulcer Disease 0 each 0     [DISCONTINUED] potassium chloride (KLOR-CON) 10 MEQ CR tablet Take 1 tablet by mouth. Every other day (Patient taking differently: daily ) 15 tablet 3     [DISCONTINUED] budesonide-formoterol (SYMBICORT) 160-4.5 MCG/ACT inhaler Inhale 2 puffs into the lungs 2 times daily. (Patient taking differently: Inhale 2 puffs into the lungs daily ) 1 Inhaler 5     ipratropium - albuterol 0.5 mg/2.5 mg/3 mL (DUONEB) 0.5-2.5 (3) MG/3ML nebulization Take 3 mLs by nebulization every 6 hours as needed for shortness of breath / dyspnea. 1 Box 1     No facility-administered encounter medications on file as of 4/7/2017.       ASSESSMENT:   1.  Spinal stenosis.   2.  Likely neuropathic pain.   3.  History of DVT in the past with varicose veins.   4.  Coronary artery disease with remote history of a stent.   5.  Hyperlipidemia.   6.  Hypertension.      RECOMMENDATIONS:  We have gone over patient's recent duplex ultrasound which was performed in 12/2016.  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  She did not have ABIs performed due to pain in her legs; however, the duplex signal suggests arterial flow should be adequate.  She does not have any open wounds or sores and her symptoms appear to be most compatible with neurogenic claudication or neuropathic symptoms from her spinal stenosis.  I have advised her that conservative management is usually appropriate and I will try to arrange for physical therapy, mainly focused on back exercises and mobility, to help relieve her pain.  I have also asked her to use her p.r.n. pain medications as-needed and not to be shy about requesting them.  She is somewhat reluctant to ask for  them.      Thank you kindly for allowing us to participate in her care.  We will ask that she come back on a p.r.n. basis.     Sincerely,    Lashay Grajeda MD     [facility]    cc:      MARILEE Lima, CNP   [facility]   [address] [place] [address], [address]   [place] [address]

## 2017-04-18 NOTE — PROGRESS NOTES
[facility]    Chief Complaint   Patient presents with     senior care Regulatory       HPI:   Obtained from the patient, medical record and from the medial staffs.     Nasima López is a 89 year old  (9/11/1926), who is being seen today for a federally mandated E/M visit at [facility] .   Today's concerns are:  Chronic pain syndrome  - Reports that she has been feeling worse every day. Uses a wheel chair for ambulation. Started on OT/PT 3x  A week.   Reports has arthritis in the whole body. Has hx of spinal stenosis.   - Describes pain as a continuous aching to sharp pain, gets better with medications but at times pain is worse in early morning.   - Reports feeling fatigue all the time but appetite is fine. Started on nutritional supplement.     PVD (peripheral vascular disease) (H)  - Saw a cardio-vascular specialist and felt that the PAD  is present, no high-grade stenoses would be suggested based on the Doppler waveform.  - Hx of spinal stenosis on Neurontin, and it was felt that the Resident's pain in the legs is of a neuropathic in origin.     Essential hypertension  - No CP, HA or fainting      Vitamin D deficiency  - Vitamin D was discontinued on April 18th, reason unknown.      - ----------------------------------------------------------------------------------  ## Past Medical, social, family histories, medications, and allergies reviewed and updated  ----------------------------------------------------------------------------------    MEDICATIONS:  Current Outpatient Prescriptions   Medication Sig Dispense Refill     bisacodyl (DULCOLAX) 10 MG Suppository Place 10 mg rectally 2 times daily as needed for constipation       ACETAMINOPHEN PO Take 650 mg by mouth every 4 hours as needed for pain       sennosides (SENOKOT) 8.6 MG tablet Take 2 tablets by mouth daily       Nutritional Supplements (NUTRITIONAL SUPPLEMENT PO) Take 4 oz by mouth daily       Pantoprazole Sodium  (PROTONIX PO) Take 40 mg by mouth At Bedtime        calcium carbonate (TUMS) 500 MG chewable tablet Take 2 chew tab by mouth daily       Morphine Sulfate (MS CONTIN PO) Take 45 mg by mouth 2 times daily        HYDROcodone-acetaminophen (NORCO) 7.5-325 MG per tablet Take 1 tablet by mouth every 4 hours as needed for moderate to severe pain       Nitroglycerin (NITROSTAT SL) Place 0.4 mg under the tongue every 5 minutes as needed for chest pain       olopatadine HCl (PATADAY) 0.2 % SOLN Place 1 drop into both eyes daily       ketorolac tromethamine (ACULAR-LS) 0.4 % SOLN ophthalmic solution Place 1 drop into both eyes 2 times daily        LOSARTAN POTASSIUM PO Take 25 mg by mouth daily       Gabapentin (NEURONTIN PO) Take 500 mg by mouth 3 times daily        furosemide (LASIX) 40 MG tablet Take 0.5 tablets by mouth daily. 15 tablet 3     ASPIRIN NOT PRESCRIBED, INTENTIONAL, Antiplatelet medication not prescribed intentionally due to GI Issues / Ulcer Disease 0 each 0     potassium chloride (KLOR-CON) 10 MEQ CR tablet Take 1 tablet by mouth. Every other day (Patient taking differently: daily ) 15 tablet 3     budesonide-formoterol (SYMBICORT) 160-4.5 MCG/ACT inhaler Inhale 2 puffs into the lungs 2 times daily. (Patient taking differently: Inhale 2 puffs into the lungs daily ) 1 Inhaler 5     ipratropium - albuterol 0.5 mg/2.5 mg/3 mL (DUONEB) 0.5-2.5 (3) MG/3ML nebulization Take 3 mLs by nebulization every 6 hours as needed for shortness of breath / dyspnea. 1 Box 1     Medications reviewed:  Medications reconciled to facility chart and changes were made to reflect current medications as identified as above med list. Below are the changes that were made:   Medications stopped since last EPIC medication reconciliation:   Medications Discontinued During This Encounter   Medication Reason     artificial saliva (BIOTENE MT) AERS spray      carbamide peroxide (DEBROX) 6.5 % otic (EAR) solution      VITAMIN D,  CHOLECALCIFEROL, PO        Medications started since last [facility] medication reconciliation:  Orders Placed This Encounter   Medications     bisacodyl (DULCOLAX) 10 MG Suppository     Sig: Place 10 mg rectally 2 times daily as needed for constipation     ACETAMINOPHEN PO     Sig: Take 650 mg by mouth every 4 hours as needed for pain     Case Management:  I have reviewed the care plan and MDS and do agree with the plan. Patient's desire to return to the community is not present.  Information reviewed:  Medications, vital signs, orders, and nursing notes.    ROS:  10 point ROS of systems including Constitutional, Eyes, Respiratory, Cardiovascular, Gastroenterology, Genitourinary, Integumentary, Muscularskeletal, Psychiatric were all negative except for pertinent positives noted in my HPI.    Exam:  /84  Pulse 82  Temp 98.3  F (36.8  C)  Resp 18  Wt 123 lb 8 oz (56 kg)  SpO2 98%  BMI 27.69 kg/m2  GENERAL APPEARANCE:  Alert, in no distress, appears healthy, cooperative  ENT:  Mouth and posterior oropharynx normal, moist mucous membranes, hearing aids b/l.  EYES:  EOM, conjunctivae, lids, pupils and irises normal, wear glasses.  NECK:  No adenopathy,masses or thyromegaly  RESP:  respiratory effort and palpation of chest normal, lungs clear to auscultation   CV:  Palpation and auscultation of heart done , regular rate and rhythm, no murmur, rub, or gallop, 1+ pitting pedal edema b/l. Diminished pedal pulse B/l.   ABDOMEN:  normal bowel sounds, soft, nontender, no hepatosplenomegaly or other masses  M/S:   limited passive ROM (general). Generalized muscles atrophy especially the proximal muscles.   SKIN: Copper like discoloration over distal legs with loss of hair.   NEURO:   Cranial nerves 2-12 are normal tested and grossly at patient's baseline, no NFD detected. Passive and active ROM diminished in shoulder b/l.    PSYCH:  oriented x person, place, month but not year or day. Mood and affect appropriate. Memory  affected.   -------------------------------------------------------------------------------------  Lab/Diagnostic data:  Reviewed in the chart and EHR.          ASSESSMENT/PLAN  Chronic pain syndrome  - analgesia suboptimal, change MS continue frequency from bid to q12, continue prn norco, continue to adjust dose.   - Consider fentanyl patch for continuous relief instead of MS .. Discuss with family.   - Consider changing Tylenol from prn to schedule.     PVD (peripheral vascular disease) (H)  - no intervention is needed at this time.     Essential hypertension   BP Readings from Last 3 Encounters:   04/18/17 130/84   04/07/17 98/63   02/22/17 107/57   -  ON lasix 20 mg daily, losartan 25 mg daily,   - Morphine and Neurontin contribute to soft BP reading.   -- Keep SBP> 130 mmHg and DBP > 65 mmHg (levels below these increase mortality as shown by standard studies and observations).   - consider dc losartan vs giving lasix three times a week (monitor for worsening cardiac symptoms).       Vitamin D deficiency  - Vit D level 24 in November 2017.  - Vit D discontinued April 14th. The reason is unknown for the Writer, but possibly  Because Resident is WC bound now. However, plan is get the Resident back to walk and low Vit D may not help achieving this plan.     Frail elderly  - Significant  Deficits requiring NH placement. Requiring extensive assistance from nursing. Up for meals only o/w spends the day resting in bed  - Given fatigue, will check CBC, CMP and TSH.     Recommendations for GNP:  - Consider fentanyl patch for continuous relief instead of MS .. Discuss with family.   - Consider changing Tylenol from prn to schedule.   - resume Vit D 2000 units daily.         Order:  - Check TSH, CBC and BMP (Dx: fatigue)- already entered in the chart    Total time spent with patient visit was 35 min including patient visit and review of past records.      Electronically signed by:  Adrienne Patel MD

## 2017-04-25 NOTE — PROGRESS NOTES
"[facility]  PRIMARY CARE PROVIDER AND CLINIC:  Avitia Nissa [facility] [address] / [place]   Annual Comprehensive Nursing    HPI:    Nasima López is a 89 year old  (9/11/1926), seen today for an annual visit. Current issues are:      1. Chronic pain syndrome    2. Essential hypertension    3. Major depressive disorder, recurrent episode, moderate (H)    4. Coronary artery disease involving native coronary artery of native heart without angina pectoris      She continues to have shoulder, hip, ankle, hand and neck pain.  She does  Use a brace for the neck when she is in bed.  She does have limited ROM.  She is on Oxycodone ER 20 mg po BID which initially did help but this was a decrease from her previous dosing.  She is now having difficulty with toleration of the pain stating it is now at 8/10.  She is unable to determine what she would like to have for pain management including Duragesic.    Blood pressure is good.  Well controlled without changing of medications.   She does admit to feeling depressed at times.  She is \"just feeling low\".    She does have known coronary artery disease but denies having any complaints of chest pain, angina or SOB.    Patient continues to remain in facility.  CODE STATUS/ADVANCE DIRECTIVES DISCUSSION:   CPR/Full code   Patient's living condition: lives alone    ALLERGIES:Peanuts [nuts]; Augmentin; Codeine; and Inhaler [amphetamines]  PAST MEDICAL HISTORY:  has a past medical history of Advanced directives, counseling/discussion (10/1/2012); Aftercare following surgery of the teeth, oral cavity and digestive system, NEC (9/2/2014); Asthma; Bradycardia; CAD (coronary artery disease); Carotid stenosis, bilateral (7/26/2012); Carpel tunnel syndrome; Chronic airway obstruction, not elsewhere classified (9/2/2014); Chronic pain (9/2/2014); Chronic venous hypertension due to DVT; COPD (chronic obstructive pulmonary disease) (H); " Disorder of bone and cartilage, unspecified; Diverticulitis of colon (4/28/2011); DJD (degenerative joint disease); DJD (degenerative joint disease) of cervical spine (10/1/2012); Fibromyalgia (9/16/2014); GERD (gastroesophageal reflux disease); Glaucoma; Hand swelling (12/3/2014); HTN (hypertension) (1/5/2010); Hyperlipidemia LDL goal <130 (10/31/2010); Lung nodule (9/30/2011); Neuropathic pain syndrome (non-herpetic) (4/1/2011); Osteopenia (12/21/2009); Other and unspecified hyperlipidemia; Polymyalgia rheumatica (H); Presbycusis; Respiratory arrest (H) (2003); Rotator cuff injury (9/10/2014); S/P angioplasty with stent (1999); Shoulder dislocation (9/2/2014); T.I.A.; Unspecified essential hypertension; Urinary incontinence; Varicose veins of legs (9/21/2012); and 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  PAST SURGICAL HISTORY:  has a past surgical history that includes surgical history of - (1956); surgical history of - (1962); surgical history of - (1962); surgical history of - (1974); surgical history of - (1975); surgical history of - (1990); surgical history of - (1990); surgical history of - (1992); surgical history of - (2000); surgical history of - (2002); surgical history of - (2004); Colonoscopy (6/21/2011); and hysterectomy, cervix status unknown (1967).  FAMILY HISTORY: family history includes CANCER in her brother, sister, and sister; HEART DISEASE in her brother and mother; Hypertension in her brother, brother, brother, father, maternal grandfather, maternal grandmother, mother, paternal grandfather, paternal grandmother, sister, sister, and sister; Respiratory in her mother.  SOCIAL HISTORY:  reports that she has never smoked. She has never used smokeless tobacco. She reports that she does not drink alcohol or use illicit drugs.    Post Discharge Medication Reconciliation Status: discharge medications reconciled and changed, per note/orders (see AVS).  Current Outpatient Prescriptions   Medication Sig  Dispense Refill     bisacodyl (DULCOLAX) 10 MG Suppository Place 10 mg rectally 2 times daily as needed for constipation       ACETAMINOPHEN PO Take 650 mg by mouth every 4 hours as needed for pain       sennosides (SENOKOT) 8.6 MG tablet Take 2 tablets by mouth daily       Nutritional Supplements (NUTRITIONAL SUPPLEMENT PO) Take 4 oz by mouth daily       Pantoprazole Sodium (PROTONIX PO) Take 40 mg by mouth At Bedtime        calcium carbonate (TUMS) 500 MG chewable tablet Take 2 chew tab by mouth daily       Morphine Sulfate (MS CONTIN PO) Take 45 mg by mouth 2 times daily        HYDROcodone-acetaminophen (NORCO) 7.5-325 MG per tablet Take 1 tablet by mouth every 4 hours as needed for moderate to severe pain       Nitroglycerin (NITROSTAT SL) Place 0.4 mg under the tongue every 5 minutes as needed for chest pain       olopatadine HCl (PATADAY) 0.2 % SOLN Place 1 drop into both eyes daily       ketorolac tromethamine (ACULAR-LS) 0.4 % SOLN ophthalmic solution Place 1 drop into both eyes 2 times daily        LOSARTAN POTASSIUM PO Take 25 mg by mouth daily       Gabapentin (NEURONTIN PO) Take 500 mg by mouth 3 times daily        furosemide (LASIX) 40 MG tablet Take 0.5 tablets by mouth daily. 15 tablet 3     ASPIRIN NOT PRESCRIBED, INTENTIONAL, Antiplatelet medication not prescribed intentionally due to GI Issues / Ulcer Disease 0 each 0     potassium chloride (KLOR-CON) 10 MEQ CR tablet Take 1 tablet by mouth. Every other day (Patient taking differently: daily ) 15 tablet 3     budesonide-formoterol (SYMBICORT) 160-4.5 MCG/ACT inhaler Inhale 2 puffs into the lungs 2 times daily. (Patient taking differently: Inhale 2 puffs into the lungs daily ) 1 Inhaler 5     ipratropium - albuterol 0.5 mg/2.5 mg/3 mL (DUONEB) 0.5-2.5 (3) MG/3ML nebulization Take 3 mLs by nebulization every 6 hours as needed for shortness of breath / dyspnea. 1 Box 1       ROS:  4 point ROS including Respiratory, CV, GI and , other than that noted  in the HPI,  is negative    Exam:  GENERAL APPEARANCE:  Alert, in no distress  EYES:  EOM normal, Conjunctiva and lids normal  NECK:  no adenopathy, no thyromegaly  RESP:  lungs clear to auscultation , no respiratory distress  CV:  peripheral edema 1++ in LE, rate-normal  ABDOMEN:  no guarding or rebound, bowel sounds normal  M/S:   Gait and station abnormal uses a wheelchair for all mobility needs.  Digits and nails normal  SKIN:  Inspection of skin and subcutaneous tissue baseline, Palpation of skin and subcutaneous tissue baseline  NEURO:   Cranial nerves 2-12 are normal tested and grossly at patient's baseline, Examination of sensation by touch normal  PSYCH:  memory impaired , affect and mood normal    Lab/Diagnostic data:     WBC   Date Value Ref Range Status   11/23/2016 6.2 4.0 - 11.0 10e9/L Final   ]  Hemoglobin   Date Value Ref Range Status   11/23/2016 12.8 11.7 - 15.7 g/dL Final   01/07/2016 11.9 11.7 - 15.7 g/dL Final   ]  Last Basic Metabolic Panel:  Lab Results   Component Value Date     11/23/2016      Lab Results   Component Value Date    POTASSIUM 4.7 11/23/2016     Lab Results   Component Value Date    JULIO 8.3 11/23/2016     Lab Results   Component Value Date    CO2 32 11/23/2016     Lab Results   Component Value Date    BUN 14 11/23/2016     Lab Results   Component Value Date    CR 0.72 11/23/2016     Lab Results   Component Value Date    GLC 77 11/23/2016       ASSESSMENT/PLAN:  (G89.4) Chronic pain syndrome  (primary encounter diagnosis)  Plan: Discussed with patient and daughter the idea of Hospice.  Daughter has been interested for some time, patient is not able to make up her mind.  Will send hospice referral to information providing.    (I10) Essential hypertension  Plan: No changes needed.    (F33.1) Major depressive disorder, recurrent episode, moderate (H)  Plan: No additional changes needed at this time.    (I25.10) Coronary artery disease involving native coronary artery of  native heart without angina pectoris  Plan: No changes at this time.      Information reviewed:  Medications, vital signs, orders, nursing notes, problem list, hospital information. Total time spent with patient visit was 45 min including patient visit and review of past records. Greater than 50% of total time spent with counseling and coordinating care.    Electronically signed by:  MARILEE Juan CNP

## 2017-05-04 PROBLEM — Z51.5 HOSPICE CARE PATIENT: Status: ACTIVE | Noted: 2017-01-01

## 2017-05-08 NOTE — PROGRESS NOTES
[facility]    Chief Complaint   Patient presents with     Nursing Home Acute       HPI:    Nasima López is a 89 year old  (9/11/1926), who is being seen today for an episodic care visit at [facility]. Today's concern is:  Pneumonia  Pt on hospice and appearing acutely ill - resp changes with rhonchi and fever.   This am - discussions with hospice nurse and family wanting to treat with antibiotics,   Now further discussions with me and changing course given pt's wish  Pt did wake up towards end of 25 min discussion with family and is very clear with me she does not want any treatment that might prolong her life. She is ready to die and wants comfort medications in place.   nsg and family report pt taking O2 off      REVIEW OF SYSTEMS:  Unobtainable secondary to cognitive impairment or aphasia, but today pt reports not wanting treatment and wanting to die.     /66  Pulse 118  Temp 101.3  F (38.5  C)  Resp 24  Wt 122 lb (55.3 kg)  SpO2 93%  BMI 27.35 kg/m2  GENERAL APPEARANCE:  Ill appearing, frail, thin, diaphoretic  Resp: non labored, but + ronchi in noe bases no wheeze,   CV trace noe lower ext edema - not pitting,   Warm extermities.     ASSESSMENT/PLAN:  Pneumonia of both lower lobes due to infectious organism/Hospice care patient  Goals of care are for comfort   Discussed with family in room (dgt x 2 and grand dgt) -   Advance comfort meds  No abx  DC HTN/edema meds as not needed  DC ca as not needed.   - C ADVANCE CARE PLANNING FIRST 30 MINS      Orders:  1.  Start Morphine Sulfate 20mg/ml.  Give 7.5mg PO every 1hr PRN for SOB/distress  2.  DC Norco once Morphine Sulfate arrives  3.  Start Lorazepam 0.5mg PO every 1hr PRN  4.  DC Calcium/VitD  5.  Start Atropine opth gtts.  2 gtts PO every 1 hour PRN for copius secretions.  6. DC lasix,   7. DC losartan  8. DC KCL       Total time spent with patient visit was 45 min including patient visit, review of past records  and F2F discussion with family and pt in room about ACP. Greater than 50% of total time spent with counseling and coordinating care due to acute illness/pneuonia.      MARILEE House CNP

## 2017-05-09 NOTE — TELEPHONE ENCOUNTER
Hospice Update-No Response Required  ADMISSION SUMMARY NOTE BY: Jaylyn Brasher RN  PRIMARY PROVIDER: Dr. Patel  PRIMARY DX: Spinal stenosis-cervical, COPD  CO-MORBIDITIES: coronary artery disease,  osteopenia, HTN, degenerative joint disease, bilateral carotid stenosis, depression, fibromyalgia, chronic pain disorder, occlusive thrombus-femoral, non-occlusive thrombus-popiteal  POLST: DNR/DNI  CURRENT STATUS:  KPS 40. Progressive weight loss not d/t reversible causes >10% of weight.  Decreased oral intake d/t dysphagia.  Dyspnea with activity(18 at rest, 24 after activity).  Pt with chronic pain in arms, legs, feet, back, neck, requiring increased dosing of MS contin, neurontin and norco; rating pain 8/10.  3+ pitting edema.  Recent femoral and popiteal thrombus obstructions.  Unable to ambulate, requires 2 assist for all transfers.  Now incont of bowel and bladder.  Pt requires hand feeding d/t weakness and physical limitations.  Pt/family decline further interventions or hospital evaluation.  MED CHANGES: none  PATIENT GOALS: To remain comfortable and at [place].  FAMILY GOALS: To remain comfortable and at [place].  SMART GOAL:  TO have pain decreased to moderate-mild with use of PRN pain medication.  PLAN FOR NEXT 2 WEEKS: SNV 1x/wk for med and pain/symptom management; HHA 1x/wk for 1H for personal cares and skin/hair care; Massage, PT, volunteer, and  to assess and treat.

## 2017-05-11 NOTE — PROGRESS NOTES
[facility]    Chief Complaint   Patient presents with     Nursing Home Acute       HPI:    Nasima López is a 89 year old  (9/11/1926), who is being seen today for an episodic care visit at [facility]. Hospice pt who has active  noe pneumonia choosing comfort cares. Family asking to see me today to discuss prognosis. Pt looking better than last visit however looks constipated and abd distended with occ belches.   deneis over all pain   C/o dry mouth - atropine gtts at bedside and family thinks they have been using that - to be used for copious secretions -     REVIEW OF SYSTEMS:  Unobtainable secondary to cognitive impairment or aphasia, but today pt reports no pain but abd distention and believes she needs a BM  She also says she wants to die.     /61  Pulse 85  Temp 98  F (36.7  C)  Resp 17  Wt 127 lb (57.6 kg)  SpO2 93%  BMI 28.47 kg/m2  GENERAL APPEARANCE:  Alert, in no distress  resp = + ronchi noe bases but clear in apex  CV - HRRR noe localized dependent edema  aBD semi firm and distended.     ASSESSMENT/PLAN:  Chronic pain syndrome  Stable iwht current regimen - still able to take her MS contin now.     Pneumonia of both lower lobes due to infectious organism  Not resovled but still appear better than last visit    Drug-induced constipation  Sig - needs to be treated today while pt awake/able to take orals well.     Hospice care patient  Goals are comfort.        Orders:  1.  Give Senna 2 tabs PO this AM x1 dose now.  Dx:  Constipation  2.  Give Miralax 17gms PO daily.  First dose today.  3.  Give all oral meds only if able to swallow.  4.  Biotene spray--2 sprays orally every 1 hour PRN for dry mouth.  Okay to keep at bedside and family to administer.  5.  If no BM by tonight--give FREDDIE suppository    Total time spent with patient visit was 35 min including patient visit, review of past records and discussion with famil in room about ACP.  Greater than 50% of  total time spent with counseling and coordinating care due to constipation, pneumonia and dying process. .       MARILEE House CNP
